# Patient Record
Sex: MALE
[De-identification: names, ages, dates, MRNs, and addresses within clinical notes are randomized per-mention and may not be internally consistent; named-entity substitution may affect disease eponyms.]

---

## 2018-09-04 ENCOUNTER — HOSPITAL ENCOUNTER (INPATIENT)
Dept: HOSPITAL 92 - ERS | Age: 28
LOS: 2 days | Discharge: HOME | DRG: 514 | End: 2018-09-06
Attending: ORTHOPAEDIC SURGERY | Admitting: ORTHOPAEDIC SURGERY
Payer: SELF-PAY

## 2018-09-04 DIAGNOSIS — S56.426A: ICD-10-CM

## 2018-09-04 DIAGNOSIS — S56.122A: Primary | ICD-10-CM

## 2018-09-04 DIAGNOSIS — S56.424A: ICD-10-CM

## 2018-09-04 DIAGNOSIS — S56.124A: ICD-10-CM

## 2018-09-04 LAB
ANION GAP SERPL CALC-SCNC: 11 MMOL/L (ref 10–20)
APTT PPP: 30.6 SEC (ref 22.9–36.1)
BASOPHILS # BLD AUTO: 0.1 THOU/UL (ref 0–0.2)
BASOPHILS NFR BLD AUTO: 0.6 % (ref 0–1)
BUN SERPL-MCNC: 6 MG/DL (ref 8.9–20.6)
CALCIUM SERPL-MCNC: 9.4 MG/DL (ref 7.8–10.44)
CHLORIDE SERPL-SCNC: 108 MMOL/L (ref 98–107)
CO2 SERPL-SCNC: 23 MMOL/L (ref 22–29)
CREAT CL PREDICTED SERPL C-G-VRATE: 0 ML/MIN (ref 70–130)
EOSINOPHIL # BLD AUTO: 0.1 THOU/UL (ref 0–0.7)
EOSINOPHIL NFR BLD AUTO: 1 % (ref 0–10)
GLUCOSE SERPL-MCNC: 97 MG/DL (ref 70–105)
HGB BLD-MCNC: 16.3 G/DL (ref 14–18)
INR PPP: 0.9
LYMPHOCYTES # BLD: 2.3 THOU/UL (ref 1.2–3.4)
LYMPHOCYTES NFR BLD AUTO: 24.1 % (ref 21–51)
MCH RBC QN AUTO: 32.9 PG (ref 27–31)
MCV RBC AUTO: 90.8 FL (ref 78–98)
MONOCYTES # BLD AUTO: 0.5 THOU/UL (ref 0.11–0.59)
MONOCYTES NFR BLD AUTO: 5.7 % (ref 0–10)
NEUTROPHILS # BLD AUTO: 6.4 THOU/UL (ref 1.4–6.5)
NEUTROPHILS NFR BLD AUTO: 68.7 % (ref 42–75)
PLATELET # BLD AUTO: 179 THOU/UL (ref 130–400)
POTASSIUM SERPL-SCNC: 4.2 MMOL/L (ref 3.5–5.1)
PROTHROMBIN TIME: 12.7 SEC (ref 12–14.7)
RBC # BLD AUTO: 4.97 MILL/UL (ref 4.7–6.1)
SODIUM SERPL-SCNC: 138 MMOL/L (ref 136–145)
WBC # BLD AUTO: 9.3 THOU/UL (ref 4.8–10.8)

## 2018-09-04 PROCEDURE — 80202 ASSAY OF VANCOMYCIN: CPT

## 2018-09-04 PROCEDURE — 80306 DRUG TEST PRSMV INSTRMNT: CPT

## 2018-09-04 PROCEDURE — 96365 THER/PROPH/DIAG IV INF INIT: CPT

## 2018-09-04 PROCEDURE — 85730 THROMBOPLASTIN TIME PARTIAL: CPT

## 2018-09-04 PROCEDURE — S0020 INJECTION, BUPIVICAINE HYDRO: HCPCS

## 2018-09-04 PROCEDURE — A4216 STERILE WATER/SALINE, 10 ML: HCPCS

## 2018-09-04 PROCEDURE — 85610 PROTHROMBIN TIME: CPT

## 2018-09-04 PROCEDURE — 71045 X-RAY EXAM CHEST 1 VIEW: CPT

## 2018-09-04 PROCEDURE — 96375 TX/PRO/DX INJ NEW DRUG ADDON: CPT

## 2018-09-04 PROCEDURE — 90471 IMMUNIZATION ADMIN: CPT

## 2018-09-04 PROCEDURE — 90715 TDAP VACCINE 7 YRS/> IM: CPT

## 2018-09-04 PROCEDURE — 85025 COMPLETE CBC W/AUTO DIFF WBC: CPT

## 2018-09-04 PROCEDURE — 12002 RPR S/N/AX/GEN/TRNK2.6-7.5CM: CPT

## 2018-09-04 PROCEDURE — 80048 BASIC METABOLIC PNL TOTAL CA: CPT

## 2018-09-04 PROCEDURE — 80307 DRUG TEST PRSMV CHEM ANLYZR: CPT

## 2018-09-04 PROCEDURE — 36416 COLLJ CAPILLARY BLOOD SPEC: CPT

## 2018-09-04 NOTE — RAD
THREE VIEWS RIGHT HAND:

9/4/18

 

HISTORY: 

Laceration to fingers of right hand. 

 

FINDINGS:  

No fracture or dislocation is seen. There are lucencies seen involving the proximal portions of the s
econd through fourth fingers likely related to laceration. No radiopaque foreign body is seen. No oth
er findings.

 

IMPRESSION:  

Findings suggestive of laceration involving the second through fourth digits without evidence of an u
nderlying fracture or radiopaque foreign body.

 

 

POS: [facility]

## 2018-09-05 LAB
DRUG SCREEN CUTOFF: (no result)
MEDTOX CONTROL LINE VALID?: (no result)
MEDTOX READER #: (no result)

## 2018-09-05 PROCEDURE — 0LQ80ZZ REPAIR LEFT HAND TENDON, OPEN APPROACH: ICD-10-PCS | Performed by: ORTHOPAEDIC SURGERY

## 2018-09-05 PROCEDURE — 01Q60ZZ REPAIR RADIAL NERVE, OPEN APPROACH: ICD-10-PCS | Performed by: ORTHOPAEDIC SURGERY

## 2018-09-05 PROCEDURE — 0LB80ZZ EXCISION OF LEFT HAND TENDON, OPEN APPROACH: ICD-10-PCS | Performed by: ORTHOPAEDIC SURGERY

## 2018-09-05 PROCEDURE — 01Q40ZZ REPAIR ULNAR NERVE, OPEN APPROACH: ICD-10-PCS | Performed by: ORTHOPAEDIC SURGERY

## 2018-09-05 RX ADMIN — VANCOMYCIN HYDROCHLORIDE SCH MLS: 1 INJECTION, POWDER, LYOPHILIZED, FOR SOLUTION INTRAVENOUS at 21:54

## 2018-09-05 RX ADMIN — VANCOMYCIN HYDROCHLORIDE SCH MLS: 1 INJECTION, POWDER, LYOPHILIZED, FOR SOLUTION INTRAVENOUS at 14:52

## 2018-09-05 RX ADMIN — ASPIRIN SCH MG: 81 TABLET ORAL at 10:25

## 2018-09-05 RX ADMIN — HYDROCODONE BITARTRATE AND ACETAMINOPHEN PRN TAB: 5; 325 TABLET ORAL at 10:24

## 2018-09-05 RX ADMIN — HYDROCODONE BITARTRATE AND ACETAMINOPHEN PRN TAB: 5; 325 TABLET ORAL at 14:53

## 2018-09-05 RX ADMIN — ASPIRIN SCH MG: 81 TABLET ORAL at 20:56

## 2018-09-06 VITALS — SYSTOLIC BLOOD PRESSURE: 137 MMHG | TEMPERATURE: 99 F | DIASTOLIC BLOOD PRESSURE: 89 MMHG

## 2018-09-06 LAB — VANCOMYCIN TROUGH SERPL-MCNC: 23.4 UG/ML

## 2018-09-06 RX ADMIN — HYDROCODONE BITARTRATE AND ACETAMINOPHEN PRN TAB: 5; 325 TABLET ORAL at 09:27

## 2018-09-06 RX ADMIN — HYDROCODONE BITARTRATE AND ACETAMINOPHEN PRN TAB: 5; 325 TABLET ORAL at 00:16

## 2018-09-06 NOTE — OP
DATE OF PROCEDURE:  09/05/2018

 

PREOPERATIVE DIAGNOSES:  Multiple palmar and dorsal wounds; proximal phalanx small finger, ring finge
r, middle finger and index finger, sizes as follows, 2.5 cm palmar, small finger; 3.0 cm palmar, ring
 finger; 3.0 cm palmar, middle finger; 3.5 cm palmar, index finger and all 1 cm dorsal of all four di
gits.

 

POSTOPERATIVE FINDINGS:

1.  Multiple digit laceration with the following tendon injuries:  Left index finger flexor digitorum
 superficialis, 50% laceration.

2.  Left middle finger flexor digitorum superficialis _____  laceration.

3.  Left ring finger extensor tendon longitudinal laceration 1 cm zone 4 and left middle finger exten
sor tendon laceration longitudinal 1 cm zone 4.

4.  Minimal contamination seen except for the axis of the flexor tendon laceration where there was so
me _____ magnification.

 

PROCEDURES PERFORMED:  As follows:

1.  At the left small finger:

A.  Dorsal wound debridement 1 cm.

B.  Probable debridement 1 cm.

C.  Total wound closure 3.5 cm.

D.  Radial and ulnar nerve neuroplasty under magnification.

E.  Microscopic neuroplasty radial and ulnar digital nerve.

2.  At the left ring finger:

A.  Dorsal wound debridement 1.0 cm.

B.  Palmar wound debridement, 3.0 cm.

C.  Closure of wound total 4 cm, left ring finger.

D.  Repair common extensor tendon laceration, zone 4, dorsal ring finger, left.

E.  Microscopic neuroplasty radial and ulnar digital nerve.

3.  At the middle finger:

A.  A 3-cm palmar wound debridement.

B.  A 1.0 cm dorsal wound debridement.

C.  Closure of 4 cm wound, complex.

D.  Left middle finger flexor digitorum superficialis laceration repair.

E.  Left ring finger extensor tendon zone 4 repair.

F.  Microscopic neuroplasty radial and ulnar digital nerve.

4.  At the index finger:

A.  Debridement of wound, 4.5 cm total (3.5 cm palmar and 1.0 cm dorsal).

B.  Closure of wound total 4.5 cm complex.

C.  Index finger flexor digitorum superficialis laceration repair.

D.  Neuroplasty under magnification ulnar and radial digital nerve to the index finger.

 

Debridement techniques for all were as follows:

A.  Excisional technique.

B.  Use of curette, rongeur _____  Grethel Elevator, tenotomy scissors, Jackson blade, and 15 blade knif
e.

C.  Depth was down to and including the tendon sheath where there were tendon sheath lacerations, but
 did not repair.  All pulleys were grossly intact.

 

ANESTHESIA:  General LMA technique.

 

TOURNIQUET TIME:  Total 95 minutes.

 

ESTIMATED BLOOD LOSS:  Less than 50 mL.

 

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
ks.  He was wearing gloves.  He was taken to the operating, because of gross contamination, resisted 
pain with flexion of the interphalangeal joints of the index finger and middle finger _____ possible 
flexor digitorum partial laceration and the open wounds.  _____ operating room prior to 8 hours lapse
 in between the injury and procedure.  Of note, there was no digital nerve laceration, small finger, 
ring finger, middle finger, and index finger.

 

DESCRIPTION OF PROCEDURE:  After successful general LMA technique, the limb was prepped and draped.  
We exsanguinated the limb.  It was injected with a total of 30 mL of 0.5% Marcaine, and having a meta
carpophalangeal joint block on the radial ulnar aspect of this joint for all four digits.  Total of 3
0 mL were given before incision was made.  After exsanguination of the limb and tourniquet being infl
ated to 250 mmHg pressure on the palmar wounds, we extend the each wound 1 cm proximal and 1.5 cm dis
tally in a Camron fashion.  We dissected down and saw each digital nerve radial ulnar aspect of first
 index and the long and the ring and small finger.  Once we did this, we then inspected the flexor te
ndons and found that the only tendon laceration which was approximately 50% on the ulnar side, was th
e flexor digitorum superficialis _____  intact at all levels.  Each pulley had laceration in his dist
al 1-2 mm were resected _____  longitudinal transverse.  Once we finished doing the debridement using
 the reamer technique listed above, we then proceeded to mony that there was the index finger and mid
dle finger _____  ulnar-sided laceration, but over the 100% of the ulnar side, but 50% _____ .

 

Returned to the dorsal wound extended then 1 cm distal, 5 mm proximal in a zigzag Camron fashion as w
ell and _____  found longitudinal tear of the extensor mechanisms without any evidence of other abnor
mality.

 

We had completed the debridement, completed irrigation of a total of 5 mL normal saline.  Now, we vis
ualized the dorsal wound and then repaired with a running 5-0 Prolene.  The longitudinal lacerations 
in zone 4 without complications.

 

The repairs were accomplished with a figure-of-eight and the flexor tendons _____  direct visualizati
on with the help of the assistant and two sutures were placed in each of the repairs and cut at a bel
ow the level of the tendon sheath.  Then, the dorsal repairs had been fitted with a 5-0 Prolene in a 
running fashion buried figure-of-eight and they were cut.  Now, with the tourniquet deflated, we obta
ined hemostasis.  All wounds were closed with simple 4-0 nylon interrupted simple pattern to include 
the digit palmar and the dorsal wounds were _____ .  The patient left the operating room without evid
ence of anesthetic or operative complication.  Once incisions were closed, the patient has had the wo
und evaluated, had appropriate flexion cascade for all digits, digits were pink, we placed bacitracin
, Adaptic, 4 x 4s, gauze and a bulky dressing style, leaving 1 cm of the tips visible for neurovascul
ar evaluation and motion.  Dorsal block splint was applied.  There was a sugar tong type to immobiliz
e the wrist and forearm and the patient left the operating room without evidence of anesthetic or ope
rative complication.

## 2018-11-28 ENCOUNTER — HOSPITAL ENCOUNTER (OUTPATIENT)
Dept: HOSPITAL 92 - BICMRI | Age: 28
Discharge: HOME | End: 2018-11-28
Attending: ORTHOPAEDIC SURGERY
Payer: COMMERCIAL

## 2018-11-28 DIAGNOSIS — M65.9: ICD-10-CM

## 2018-11-28 DIAGNOSIS — S56.021A: Primary | ICD-10-CM

## 2018-11-28 NOTE — MRI
MRI OF THE RIGHT HAND WITHOUT CONTRAST:

11/28/18

 

INDICATION:

History of flexor tenosynovitis of the right fourth digit.

 

COMPARISON:  

Right hand radiograph dated 9/4/18. 

 

FINDINGS:  

There is a small amount of fluid surrounding the flexor tendons of the long and ring finger at the le
ashley of the metacarpal heads. No full thickness tendon disruption is evident. No bowstring is present.
 No definite volar plate or pulley injury is grossly evident. No definite drainable fluid collection 
is noted. The intrinsic hand musculature appears within normal limits. The visualized aspects of the 
ulnar and median nerves appear within normal limits. The FCR and FCU tendons appear within normal moise
its. The extensor tendons are normal appearing. Visualized aspects of the scapholunate and lunatotriq
uetral ligament appears within normal limits. Subchondral cyst-like abnormality seen on the volar asp
ect of the lunate. 

 

IMPRESSION:  

Mild flexor tenosynovitis of the long and ring finger at the level of the metacarpal heads without ev
idence of tendon disruption or definite pulley injury. 

 

POS: [facility]